# Patient Record
Sex: MALE | URBAN - METROPOLITAN AREA
[De-identification: names, ages, dates, MRNs, and addresses within clinical notes are randomized per-mention and may not be internally consistent; named-entity substitution may affect disease eponyms.]

---

## 2018-12-17 ENCOUNTER — IMPORTED ENCOUNTER (OUTPATIENT)
Dept: URBAN - METROPOLITAN AREA CLINIC 9 | Facility: CLINIC | Age: 73
End: 2018-12-17

## 2019-01-10 ENCOUNTER — IMPORTED ENCOUNTER (OUTPATIENT)
Dept: URBAN - METROPOLITAN AREA CLINIC 9 | Facility: CLINIC | Age: 74
End: 2019-01-10

## 2019-01-14 ENCOUNTER — IMPORTED ENCOUNTER (OUTPATIENT)
Dept: URBAN - METROPOLITAN AREA CLINIC 9 | Facility: CLINIC | Age: 74
End: 2019-01-14

## 2019-01-17 ENCOUNTER — IMPORTED ENCOUNTER (OUTPATIENT)
Dept: URBAN - METROPOLITAN AREA CLINIC 9 | Facility: CLINIC | Age: 74
End: 2019-01-17

## 2019-04-15 ENCOUNTER — IMPORTED ENCOUNTER (OUTPATIENT)
Dept: URBAN - METROPOLITAN AREA CLINIC 9 | Facility: CLINIC | Age: 74
End: 2019-04-15

## 2021-10-16 ASSESSMENT — TONOMETRY
OS_IOP_MMHG: 13
OD_IOP_MMHG: 12
OS_IOP_MMHG: 16
OD_IOP_MMHG: 10
OD_IOP_MMHG: 12
OD_IOP_MMHG: 14
OS_IOP_MMHG: 10

## 2021-10-16 ASSESSMENT — VISUAL ACUITY
OD_SC: 20/20 ET
OD_SC: 20/400 SN
OS_SC: 20/60 SN
OS_SC: 20/40 SN
OD_CC: 20/400 SN
OD_CC: 20/30 SN
OD_PH: 20/40 SN
OD_SC: 20/25 SN
OS_CC: 20/25 SN
OD_SC: 20/30 SN
OD_SC: 20/25 - ET

## 2021-10-16 ASSESSMENT — KERATOMETRY
OD_K1POWER_DIOPTERS: 40.5
OD_AXISANGLE_DEGREES: 168
OD_AXISANGLE2_DEGREES: 78
OS_K2POWER_DIOPTERS: 41.5
OS_AXISANGLE2_DEGREES: 93
OS_AXISANGLE_DEGREES: 3
OD_K2POWER_DIOPTERS: 41.5
OS_K1POWER_DIOPTERS: 40.5

## 2022-07-03 RX ORDER — ATORVASTATIN CALCIUM 20 MG/1
TABLET, FILM COATED ORAL
COMMUNITY

## 2022-07-03 RX ORDER — CLOPIDOGREL BISULFATE 75 MG/1
TABLET ORAL
COMMUNITY

## 2022-07-03 RX ORDER — SUCRALFATE 1 G/1
TABLET ORAL
COMMUNITY

## 2022-07-03 RX ORDER — UBIDECARENONE 100 MG
CAPSULE ORAL
COMMUNITY

## 2022-07-03 RX ORDER — OMEPRAZOLE 20 MG/1
1 CAPSULE, DELAYED RELEASE ORAL
COMMUNITY

## 2022-07-20 LAB
25(OH)D3 SERPL-MCNC: 38.3 NG/ML (ref 30–90)
ALBUMIN SERPL-MCNC: 4.1 G/DL (ref 3.5–5.2)
ALBUMIN/GLOB SERPL: 1 {RATIO} (ref 1–2.7)
ALP SERPL-CCNC: 99 UNIT/L (ref 40–130)
ALT SERPL-CCNC: 35 UNIT/L (ref 0–50)
ANION GAP SERPL CALC-SCNC: 8 MMOL/L (ref 2–17)
AST SERPL-CCNC: 61 UNIT/L (ref 0–50)
BASOPHILS # BLD AUTO: 0.1 X10E3/MCL (ref 0–0.2)
BASOPHILS NFR BLD AUTO: 1 % (ref 0–2)
BILIRUB SERPL-MCNC: 0.55 MG/DL (ref 0–1.2)
BUN SERPL-MCNC: 22 MG/DL (ref 8–23)
CALCIUM SERPL-MCNC: 9.4 MG/DL (ref 8.8–10.2)
CHLORIDE SERPL-SCNC: 105 MMOL/L (ref 98–107)
CHOLEST SERPL-MCNC: 139 MG/DL (ref 100–200)
CREAT SERPL-MCNC: 0.9 MG/DL (ref 0.7–1.3)
DEPRECATED HCO3 PLAS-SCNC: 28 MMOL/L (ref 22–29)
EOSINOPHIL # BLD AUTO: 0.5 X10E3/MCL (ref 0–0.5)
EOSINOPHIL NFR BLD AUTO: 7 % (ref 0–7)
ERYTHROCYTE [DISTWIDTH] IN BLOOD BY AUTOMATED COUNT: 13 % (ref 11–16)
EST. AVERAGE GLUCOSE BLD GHB EST-MCNC: 117 MG/DL
EST. AVERAGE GLUCOSE BLD GHB EST-MCNC: 126 MG/DL
GFR SERPL CREATININE-BSD FRML MDRD: 89 ML/MIN/1.73M²
GLOBULIN SER CALC-MCNC: 3 G/DL (ref 1.9–4.4)
GLUCOSE SERPL-MCNC: 98 MG/DL (ref 70–99)
HBA1C MFR BLD: 5.7 % (ref 4–6)
HCT VFR BLD AUTO: 42.2 % (ref 38–52)
HDLC SERPL-MCNC: 67 MG/DL
HDLC/LDLC SERPL: 1
HDLC/LDLC SERPL: 2.1 (ref 0–4.4)
HGB BLD-MCNC: 13.7 G/DL (ref 13–17.3)
IMMATURE GRANS (ABS): 0.02 X10E3/MCL (ref 0–0.06)
IMMATURE GRANULOCYTES: 0.3 % (ref 0–0.6)
LDLC SERPL CALC-MCNC: 61 MG/DL (ref 0–100)
LYMPHOCYTES # SPEC AUTO: 1.8 X10E3/MCL (ref 1–3.2)
LYMPHOCYTES NFR BLD AUTO: 26.7 % (ref 15–45)
MCH RBC QN AUTO: 31 PG (ref 27–34.5)
MCHC RBC AUTO-ENTMCNC: 32.5 G/DL (ref 32–36)
MCV RBC AUTO: 95.5 FL (ref 84–100)
MONOCYTES # BLD AUTO: 0.6 X10E3/MCL (ref 0.3–1)
MONOCYTES NFR BLD AUTO: 9 % (ref 4–12)
NEUTROPHILS # BLD AUTO: 3.9 X10E3/MCL (ref 1.6–7.3)
NEUTROPHILS NFR BLD AUTO: 56 % (ref 42–74)
OSMOLALITY SERPL CALC.SUM OF ELEC: 284 MOSM/KG (ref 270–287)
PLATELET # BLD AUTO: 190 X10E3/MCL (ref 140–440)
PMV BLD AUTO: 11 FL (ref 7.2–13.2)
POTASSIUM SERPL-SCNC: 4.5 MMOL/L (ref 3.5–5.3)
PROT SERPL-MCNC: 7.1 G/DL (ref 6.4–8.3)
PSA SERPL-MCNC: 10.7 NG/ML (ref 0–4)
RBC # BLD AUTO: 4.42 X10E6/MCL (ref 4–5.6)
SODIUM SERPL-SCNC: 141 MMOL/L (ref 135–145)
TRIGL SERPL-MCNC: 51 MG/DL (ref 0–149)
VLDLC SERPL CALC-MCNC: 10.2 MG/DL (ref 5–40)
WBC # BLD AUTO: 6.9 X10E3/MCL (ref 3.8–10.6)

## 2022-09-26 ENCOUNTER — NEW PATIENT (OUTPATIENT)
Dept: URBAN - METROPOLITAN AREA CLINIC 18 | Facility: CLINIC | Age: 77
End: 2022-09-26

## 2022-09-26 DIAGNOSIS — H43.813: ICD-10-CM

## 2022-09-26 DIAGNOSIS — H43.313: ICD-10-CM

## 2022-09-26 PROCEDURE — 99204 OFFICE O/P NEW MOD 45 MIN: CPT

## 2022-09-26 PROCEDURE — 92201 OPSCPY EXTND RTA DRAW UNI/BI: CPT

## 2022-09-26 ASSESSMENT — TONOMETRY
OD_IOP_MMHG: 11
OS_IOP_MMHG: 11

## 2022-09-26 ASSESSMENT — VISUAL ACUITY
OS_PH: 20/20
OD_SC: 20/20-1
OS_SC: 20/50-1

## 2022-10-26 ENCOUNTER — SURGERY/PROCEDURE (OUTPATIENT)
Dept: URBAN - METROPOLITAN AREA EYE CENTER 1 | Facility: EYE CENTER | Age: 77
End: 2022-10-26

## 2022-10-26 DIAGNOSIS — H43.311: ICD-10-CM

## 2022-10-26 DIAGNOSIS — H33.311: ICD-10-CM

## 2022-10-26 PROCEDURE — 67039 LASER TREATMENT OF RETINA: CPT

## 2022-10-27 ENCOUNTER — POST-OP (OUTPATIENT)
Dept: URBAN - METROPOLITAN AREA CLINIC 11 | Facility: CLINIC | Age: 77
End: 2022-10-27

## 2022-10-27 DIAGNOSIS — H43.313: ICD-10-CM

## 2022-10-27 PROCEDURE — 99024 POSTOP FOLLOW-UP VISIT: CPT

## 2022-10-27 ASSESSMENT — VISUAL ACUITY
OS_SC: 20/25-1
OD_PH: 20/100+1
OD_SC: 20/200

## 2022-10-27 ASSESSMENT — TONOMETRY
OD_IOP_MMHG: 8
OS_IOP_MMHG: 15

## 2022-11-03 ENCOUNTER — POST-OP (OUTPATIENT)
Dept: URBAN - METROPOLITAN AREA CLINIC 11 | Facility: CLINIC | Age: 77
End: 2022-11-03

## 2022-11-03 DIAGNOSIS — H43.313: ICD-10-CM

## 2022-11-03 PROCEDURE — 99024 POSTOP FOLLOW-UP VISIT: CPT

## 2022-11-03 ASSESSMENT — TONOMETRY
OS_IOP_MMHG: 12
OD_IOP_MMHG: 20

## 2022-11-03 ASSESSMENT — VISUAL ACUITY
OD_SC: 20/30-2
OS_SC: 20/25-2

## 2022-11-21 ENCOUNTER — SURGERY/PROCEDURE (OUTPATIENT)
Dept: URBAN - METROPOLITAN AREA EYE CENTER 1 | Facility: EYE CENTER | Age: 77
End: 2022-11-21

## 2022-11-21 DIAGNOSIS — H43.313: ICD-10-CM

## 2022-11-21 PROCEDURE — 67036 REMOVAL OF INNER EYE FLUID: CPT

## 2022-11-22 ENCOUNTER — POST-OP (OUTPATIENT)
Dept: URBAN - METROPOLITAN AREA CLINIC 11 | Facility: CLINIC | Age: 77
End: 2022-11-22

## 2022-11-22 DIAGNOSIS — H43.313: ICD-10-CM

## 2022-11-22 PROCEDURE — 99024 POSTOP FOLLOW-UP VISIT: CPT

## 2022-11-22 ASSESSMENT — VISUAL ACUITY
OS_SC: 20/60-2
OD_SC: 20/20-1

## 2022-11-22 ASSESSMENT — TONOMETRY: OD_IOP_MMHG: 13

## 2022-11-28 ENCOUNTER — POST-OP (OUTPATIENT)
Dept: URBAN - METROPOLITAN AREA CLINIC 18 | Facility: CLINIC | Age: 77
End: 2022-11-28

## 2022-11-28 DIAGNOSIS — H43.313: ICD-10-CM

## 2022-11-28 PROCEDURE — 99024 POSTOP FOLLOW-UP VISIT: CPT

## 2022-11-28 ASSESSMENT — VISUAL ACUITY
OS_SC: 20/40
OD_SC: 20/20-1

## 2022-11-28 ASSESSMENT — TONOMETRY
OD_IOP_MMHG: 14
OS_IOP_MMHG: 9

## 2023-01-20 ENCOUNTER — POST-OP (OUTPATIENT)
Dept: URBAN - METROPOLITAN AREA CLINIC 18 | Facility: CLINIC | Age: 78
End: 2023-01-20

## 2023-01-20 DIAGNOSIS — H43.312: ICD-10-CM

## 2023-01-20 PROCEDURE — 99024 POSTOP FOLLOW-UP VISIT: CPT

## 2023-01-20 ASSESSMENT — VISUAL ACUITY
OS_SC: 20/30-2
OD_SC: 20/20-1

## 2023-01-20 ASSESSMENT — TONOMETRY
OD_IOP_MMHG: 15
OS_IOP_MMHG: 16

## 2025-03-14 NOTE — PATIENT DISCUSSION
----- Message from Shannan Reyes PA-C sent at 3/14/2025  8:39 AM CDT -----  Lab/studies reviewed by me from 3/10/2025; Blood cells are normal. Kidney levels are normal, stable mostly. Have pt decrease hydrochlorothiazide 12.5 mg po qam to improve sodium and BUN levels.    Lens Disposal Schedule: Any